# Patient Record
(demographics unavailable — no encounter records)

---

## 2024-10-14 NOTE — HISTORY OF PRESENT ILLNESS
[FreeTextEntry8] : APPROXIMATELY ONE WEEK. WENT TO DENTIST AND PLACED ON ANTIBIOTICS ABOUT THREE WEEKS AGO.  GIVEN AMOXICILLIN.  FINISHED IT.  3 - 4 DAYS LATER HAD DIARRHEA FOR 1 1/2 WEEKS.   SATURDAY NIGHT VOMITING AND SEVERE DIARRHEA.  SWEATS AND CHILLS.  EATING LIGHT.   HAS ABDOMINAL PAIN ON THE LEFT SIDE TO HER BACK FOR A FEW DAYS.   DIARRHEA HAS STOPPED.  VOMITTING RESOLVED. NO BLOOD IN STOOL.  LAST BOWEL MOVEMENT WAS SATURDAY.   DECREASED APPETITE.  NO FEVER.   FEELING SHORT OF BREATH "A LOT" FOR ABOUT A MONTH.  NO CHEST PAIN. IS GETTING A LOT OF PALPITATIONS FOR THREE WEEKS.  ALMOST DAILY LASTING A MINUTE. NO URINARY COMPLAINTS. HISTORY OF DIVERTICULITIS.  LAST JULY LAST EPISODE. SAW CARDIOLOGY APRIL 2024 WHEEZING BILATERALLY, TENDER LEFT LOWER GETTING DIZZINESS AMBULANCE DECLINED

## 2024-11-06 NOTE — HISTORY OF PRESENT ILLNESS
[FreeTextEntry1] : Ms Esposito presents to the office for consultation secondary to recent episode of acute constipation.  She notes that around 10/20/2024, she was seen in the ED for nausea/vomiting/diarrhea.  At that time, she received a CT A/P which was noted to be otherwise unremarkable.  She was diagnosed with a viral gastroenteritis and discharge.  Over the subsequent days, the diarrhea resolved and was replaced by constipation.  She became so constipated that her  had to manually disimpact her.  She experienced resultant hemorrhoidal irritation and discomfort.  She is here now for evaluation of this.  Since this episode of disimpaction, she has been using Colace and is passing stools every other day whereas her baseline is once daily.  She is up-to-date with her colonoscopy with the most recent being 8/31/2023.

## 2024-11-06 NOTE — ASSESSMENT
[FreeTextEntry1] : Ms Esposito presents to the office with acute on chronic constipation after recovering from a viral gastroenteritis.  The constipation has improved though she is still not at her baseline function.  JOON and anoscopy did not reveal any evidence of an anal fissure and did not reveal significantly engorged or inflamed internal hemorrhoids.  I discussed with her the benefits of improving her bowel regimen by using Metamucil in the morning, drinking 2 L of water a day and using MiraLAX at night.  Recommended discontinuation of Colace if using MiraLAX.  Her rectal discomfort is likely hemorrhoidal in nature and I will prescribe hydrocortisone cream 2.5% 3 times a day for symptomatic relief.  She was overall reassured given recent colonoscopy and negative CT A/P.  All questions and concerns were addressed.  Follow-up as needed.

## 2025-03-17 NOTE — HISTORY OF PRESENT ILLNESS
[de-identified] : SAW DR. DELEON LAST WEEK.  FOLLOWED UP ASTHMA.  HAD ALLERGY TESTING PERFORMED.  GIVEN STEROID SHOT AND STARTED ON TRELEGY INHALER AND ALBUTEROL PRN.  ALSO STARTED ON SINGULAIR. FEELING WELL ON LEXAPRO.  FEELS CALMER ON MEDICATION.  NOT DEPRESSED OR DOWN IN GENERAL JUST SAD AT TIMES WITH LOSS OF HER SON.  NO SUICIDAL/HOMICIDAL IDEATIONS OR PLANS.  NO PANIC ATTACKS.  FOLLOWS WITH PSYCHOLOGIST DR CHAUDHRY.   FOLLOWS WITH CARDIOLOGY - UPCOMING APPOINTMENT IN MAY.